# Patient Record
Sex: MALE | Race: BLACK OR AFRICAN AMERICAN | NOT HISPANIC OR LATINO | Employment: FULL TIME | URBAN - METROPOLITAN AREA
[De-identification: names, ages, dates, MRNs, and addresses within clinical notes are randomized per-mention and may not be internally consistent; named-entity substitution may affect disease eponyms.]

---

## 2017-04-25 ENCOUNTER — HOSPITAL ENCOUNTER (OUTPATIENT)
Dept: FAMILY MEDICINE CLINIC | Facility: CLINIC | Age: 47
Setting detail: SPECIMEN
Discharge: HOME | End: 2017-04-25
Attending: FAMILY MEDICINE | Admitting: FAMILY MEDICINE

## 2017-04-25 LAB
ANION GAP SERPL CALC-SCNC: 15 MMOL/L (ref 10–20)
BUN SERPL-MCNC: 9 MG/DL (ref 8–20)
BUN/CREAT SERPL: 6.4 (ref 6.2–20.3)
CALCIUM SERPL-MCNC: 9.5 MG/DL (ref 8.9–10.3)
CHLORIDE SERPL-SCNC: 103 MMOL/L (ref 101–111)
CHOLEST SERPL-MCNC: 155 MG/DL
CHOLEST/HDLC SERPL: 2.5 {RATIO}
CONV CO2: 28 MMOL/L (ref 22–32)
CONV LDL CHOLESTEROL DIRECT: 69 MG/DL (ref 0–100)
CREAT UR-MCNC: 1.4 MG/DL (ref 0.7–1.2)
GLUCOSE SERPL-MCNC: 91 MG/DL (ref 65–99)
HDLC SERPL-MCNC: 63 MG/DL
LDLC/HDLC SERPL: 1.1 {RATIO}
LIPID INTERPRETATION: ABNORMAL
POTASSIUM SERPL-SCNC: 4 MMOL/L (ref 3.6–5.1)
SODIUM SERPL-SCNC: 142 MMOL/L (ref 136–144)
TRIGL SERPL-MCNC: 51 MG/DL
VLDLC SERPL CALC-MCNC: 23.5 MG/DL

## 2017-07-03 ENCOUNTER — HOSPITAL ENCOUNTER (OUTPATIENT)
Dept: MRI IMAGING | Facility: HOSPITAL | Age: 47
Discharge: HOME | End: 2017-07-03
Attending: ORTHOPAEDIC SURGERY | Admitting: ORTHOPAEDIC SURGERY

## 2017-07-06 ENCOUNTER — HOSPITAL ENCOUNTER (OUTPATIENT)
Dept: PREADMISSION TESTING | Facility: HOSPITAL | Age: 47
Discharge: HOME | End: 2017-07-06
Attending: ORTHOPAEDIC SURGERY | Admitting: ORTHOPAEDIC SURGERY

## 2017-07-06 LAB
ANION GAP SERPL CALC-SCNC: 11.9 MMOL/L (ref 10–20)
BACTERIA SPEC AEROBE CULT: NORMAL
BASOPHILS # BLD AUTO: 0.1 10*3/UL (ref 0–0.2)
BASOPHILS NFR BLD AUTO: 1 % (ref 0–2)
BILIRUB UR QL STRIP: NEGATIVE MG/DL
BUN SERPL-MCNC: 14 MG/DL (ref 8–20)
BUN/CREAT SERPL: 11.7 (ref 6.2–20.3)
CALCIUM SERPL-MCNC: 9.4 MG/DL (ref 8.9–10.3)
CASTS URNS QL MICRO: NORMAL /[LPF]
CHLORIDE SERPL-SCNC: 103 MMOL/L (ref 101–111)
COLOR UR: YELLOW
CONV BACTERIA IN URINE MICRO: NEGATIVE
CONV CLARITY OF URINE: CLEAR
CONV CO2: 28 MMOL/L (ref 22–32)
CONV HYALINE CASTS IN URINE MICRO: 0 /[LPF] (ref 0–5)
CONV PROTEIN IN URINE BY AUTOMATED TEST STRIP: NEGATIVE MG/DL
CONV SMALL ROUND CELLS: NORMAL /[HPF]
CONV UROBILINOGEN IN URINE BY AUTOMATED TEST STRIP: 1 MG/DL
CREAT UR-MCNC: 1.2 MG/DL (ref 0.7–1.2)
CULTURE INDICATED?: NORMAL
DIFFERENTIAL METHOD BLD: (no result)
EOSINOPHIL # BLD AUTO: 0.4 10*3/UL (ref 0–0.3)
EOSINOPHIL # BLD AUTO: 7 % (ref 0–3)
ERYTHROCYTE [DISTWIDTH] IN BLOOD BY AUTOMATED COUNT: 12.8 % (ref 11.5–14.5)
GLUCOSE SERPL-MCNC: 92 MG/DL (ref 65–99)
GLUCOSE UR QL: NEGATIVE MG/DL
HCT VFR BLD AUTO: 46.2 % (ref 40–54)
HGB BLD-MCNC: 15.6 G/DL (ref 14–18)
HGB UR QL STRIP: NEGATIVE
KETONES UR QL STRIP: NEGATIVE MG/DL
LEUKOCYTE ESTERASE UR QL STRIP: NEGATIVE
LYMPHOCYTES # BLD AUTO: 1.6 10*3/UL (ref 0.8–4.8)
LYMPHOCYTES NFR BLD AUTO: 26 % (ref 18–42)
Lab: NORMAL
MCH RBC QN AUTO: 29 PG (ref 26–32)
MCHC RBC AUTO-ENTMCNC: 33.9 G/DL (ref 32–36)
MCV RBC AUTO: 85.6 FL (ref 80–94)
MICRO REPORT STATUS: NORMAL
MONOCYTES # BLD AUTO: 0.7 10*3/UL (ref 0.1–1.3)
MONOCYTES NFR BLD AUTO: 12 % (ref 2–11)
NEUTROPHILS # BLD AUTO: 3.2 10*3/UL (ref 2.3–8.6)
NEUTROPHILS NFR BLD AUTO: 54 % (ref 50–75)
NITRITE UR QL STRIP: NEGATIVE
NRBC BLD AUTO-RTO: 0 /100{WBCS}
NRBC/RBC NFR BLD MANUAL: 0 10*3/UL
PH UR STRIP.AUTO: 6.5 [PH] (ref 4.5–8)
PLATELET # BLD AUTO: 213 10*3/UL (ref 150–450)
PMV BLD AUTO: 9.1 FL (ref 7.4–10.4)
POTASSIUM SERPL-SCNC: 3.9 MMOL/L (ref 3.6–5.1)
RBC # BLD AUTO: 5.39 10*6/UL (ref 4.6–6)
RBC #/AREA URNS HPF: 0 /[HPF] (ref 0–3)
SODIUM SERPL-SCNC: 139 MMOL/L (ref 136–144)
SP GR UR: 1.02 (ref 1–1.03)
SPECIMEN SOURCE: NORMAL
SPERM URNS QL MICRO: NORMAL /[HPF]
SQUAMOUS SPT QL MICRO: 0 /[HPF] (ref 0–5)
UNIDENT CRYS URNS QL MICRO: NORMAL /[HPF]
WBC # BLD AUTO: 6 10*3/UL (ref 4.5–11.5)
WBC #/AREA URNS HPF: 0 /[HPF] (ref 0–5)
YEAST SPEC QL WET PREP: NORMAL /[HPF]

## 2017-07-12 ENCOUNTER — HOSPITAL ENCOUNTER (OUTPATIENT)
Dept: PREOP | Facility: HOSPITAL | Age: 47
Setting detail: HOSPITAL OUTPATIENT SURGERY
Discharge: HOME | End: 2017-07-12
Attending: ORTHOPAEDIC SURGERY | Admitting: ORTHOPAEDIC SURGERY

## 2018-04-24 ENCOUNTER — HOSPITAL ENCOUNTER (OUTPATIENT)
Dept: FAMILY MEDICINE CLINIC | Facility: CLINIC | Age: 48
Setting detail: SPECIMEN
Discharge: HOME | End: 2018-04-24
Attending: FAMILY MEDICINE | Admitting: FAMILY MEDICINE

## 2018-04-24 LAB
ALBUMIN SERPL-MCNC: 4 G/DL (ref 3.5–4.8)
ALBUMIN/GLOB SERPL: 1.2 {RATIO} (ref 1–1.7)
ALP SERPL-CCNC: 52 IU/L (ref 32–91)
ALT SERPL-CCNC: 27 IU/L (ref 17–63)
ANION GAP SERPL CALC-SCNC: 11.8 MMOL/L (ref 10–20)
AST SERPL-CCNC: 27 IU/L (ref 15–41)
BASOPHILS # BLD AUTO: 0 10*3/UL (ref 0–0.2)
BASOPHILS NFR BLD AUTO: 1 % (ref 0–2)
BILIRUB SERPL-MCNC: 0.5 MG/DL (ref 0.3–1.2)
BUN SERPL-MCNC: 14 MG/DL (ref 8–20)
BUN/CREAT SERPL: 10.8 (ref 6.2–20.3)
CALCIUM SERPL-MCNC: 9.2 MG/DL (ref 8.9–10.3)
CHLORIDE SERPL-SCNC: 104 MMOL/L (ref 101–111)
CHOLEST SERPL-MCNC: 140 MG/DL
CHOLEST/HDLC SERPL: 2.8 {RATIO}
CONV CO2: 28 MMOL/L (ref 22–32)
CONV LDL CHOLESTEROL DIRECT: 68 MG/DL (ref 0–100)
CONV TOTAL PROTEIN: 7.3 G/DL (ref 6.1–7.9)
CREAT UR-MCNC: 1.3 MG/DL (ref 0.7–1.2)
DIFFERENTIAL METHOD BLD: (no result)
EOSINOPHIL # BLD AUTO: 0.2 10*3/UL (ref 0–0.3)
EOSINOPHIL # BLD AUTO: 4 % (ref 0–3)
ERYTHROCYTE [DISTWIDTH] IN BLOOD BY AUTOMATED COUNT: 12.8 % (ref 11.5–14.5)
GLOBULIN UR ELPH-MCNC: 3.3 G/DL (ref 2.5–3.8)
GLUCOSE SERPL-MCNC: 86 MG/DL (ref 65–99)
HCT VFR BLD AUTO: 46.5 % (ref 40–54)
HDLC SERPL-MCNC: 51 MG/DL
HGB BLD-MCNC: 15.6 G/DL (ref 14–18)
LDLC/HDLC SERPL: 1.3 {RATIO}
LIPID INTERPRETATION: ABNORMAL
LYMPHOCYTES # BLD AUTO: 1.6 10*3/UL (ref 0.8–4.8)
LYMPHOCYTES NFR BLD AUTO: 32 % (ref 18–42)
MCH RBC QN AUTO: 28.7 PG (ref 26–32)
MCHC RBC AUTO-ENTMCNC: 33.7 G/DL (ref 32–36)
MCV RBC AUTO: 85.1 FL (ref 80–94)
MONOCYTES # BLD AUTO: 0.4 10*3/UL (ref 0.1–1.3)
MONOCYTES NFR BLD AUTO: 9 % (ref 2–11)
NEUTROPHILS # BLD AUTO: 2.6 10*3/UL (ref 2.3–8.6)
NEUTROPHILS NFR BLD AUTO: 54 % (ref 50–75)
NRBC BLD AUTO-RTO: 0 /100{WBCS}
NRBC/RBC NFR BLD MANUAL: 0 10*3/UL
PLATELET # BLD AUTO: 243 10*3/UL (ref 150–450)
PMV BLD AUTO: 9.2 FL (ref 7.4–10.4)
POTASSIUM SERPL-SCNC: 3.8 MMOL/L (ref 3.6–5.1)
RBC # BLD AUTO: 5.46 10*6/UL (ref 4.6–6)
SODIUM SERPL-SCNC: 140 MMOL/L (ref 136–144)
TRIGL SERPL-MCNC: 71 MG/DL
VLDLC SERPL CALC-MCNC: 21.7 MG/DL
WBC # BLD AUTO: 4.8 10*3/UL (ref 4.5–11.5)

## 2019-06-04 ENCOUNTER — HOSPITAL ENCOUNTER (OUTPATIENT)
Dept: FAMILY MEDICINE CLINIC | Facility: CLINIC | Age: 49
Setting detail: SPECIMEN
Discharge: HOME | End: 2019-06-04
Attending: FAMILY MEDICINE | Admitting: FAMILY MEDICINE

## 2019-06-04 ENCOUNTER — CONVERSION ENCOUNTER (OUTPATIENT)
Dept: FAMILY MEDICINE CLINIC | Facility: CLINIC | Age: 49
End: 2019-06-04

## 2019-06-04 VITALS
SYSTOLIC BLOOD PRESSURE: 127 MMHG | HEIGHT: 69 IN | WEIGHT: 204 LBS | HEART RATE: 70 BPM | BODY MASS INDEX: 30.21 KG/M2 | OXYGEN SATURATION: 97 % | DIASTOLIC BLOOD PRESSURE: 85 MMHG

## 2019-06-04 LAB
ALBUMIN SERPL-MCNC: 3.7 G/DL (ref 3.5–4.8)
ALBUMIN/GLOB SERPL: 1.3 {RATIO} (ref 1–1.7)
ALP SERPL-CCNC: 47 IU/L (ref 32–91)
ALT SERPL-CCNC: 19 IU/L (ref 17–63)
ANION GAP SERPL CALC-SCNC: 12.4 MMOL/L (ref 10–20)
AST SERPL-CCNC: 17 IU/L (ref 15–41)
BASOPHILS # BLD AUTO: 0.1 10*3/UL (ref 0–0.2)
BASOPHILS NFR BLD AUTO: 1 % (ref 0–2)
BILIRUB SERPL-MCNC: 0.9 MG/DL (ref 0.3–1.2)
BUN SERPL-MCNC: 12 MG/DL (ref 8–20)
BUN/CREAT SERPL: 9.2 (ref 6.2–20.3)
CALCIUM SERPL-MCNC: 8.8 MG/DL (ref 8.9–10.3)
CHLORIDE SERPL-SCNC: 103 MMOL/L (ref 101–111)
CHOLEST SERPL-MCNC: 166 MG/DL
CHOLEST/HDLC SERPL: 2.2 {RATIO}
CONV CO2: 27 MMOL/L (ref 22–32)
CONV LDL CHOLESTEROL DIRECT: 82 MG/DL (ref 0–100)
CONV TOTAL PROTEIN: 6.6 G/DL (ref 6.1–7.9)
CREAT UR-MCNC: 1.3 MG/DL (ref 0.7–1.2)
DIFFERENTIAL METHOD BLD: (no result)
EOSINOPHIL # BLD AUTO: 0.1 10*3/UL (ref 0–0.3)
EOSINOPHIL # BLD AUTO: 2 % (ref 0–3)
ERYTHROCYTE [DISTWIDTH] IN BLOOD BY AUTOMATED COUNT: 12.9 % (ref 11.5–14.5)
GLOBULIN UR ELPH-MCNC: 2.9 G/DL (ref 2.5–3.8)
GLUCOSE SERPL-MCNC: 84 MG/DL (ref 65–99)
HCT VFR BLD AUTO: 45.7 % (ref 40–54)
HDLC SERPL-MCNC: 75 MG/DL
HGB BLD-MCNC: 15.3 G/DL (ref 14–18)
LDLC/HDLC SERPL: 1.1 {RATIO}
LIPID INTERPRETATION: NORMAL
LYMPHOCYTES # BLD AUTO: 3 10*3/UL (ref 0.8–4.8)
LYMPHOCYTES NFR BLD AUTO: 42 % (ref 18–42)
MCH RBC QN AUTO: 29.3 PG (ref 26–32)
MCHC RBC AUTO-ENTMCNC: 33.6 G/DL (ref 32–36)
MCV RBC AUTO: 87.3 FL (ref 80–94)
MONOCYTES # BLD AUTO: 0.7 10*3/UL (ref 0.1–1.3)
MONOCYTES NFR BLD AUTO: 10 % (ref 2–11)
NEUTROPHILS # BLD AUTO: 3.1 10*3/UL (ref 2.3–8.6)
NEUTROPHILS NFR BLD AUTO: 45 % (ref 50–75)
NRBC BLD AUTO-RTO: 0 /100{WBCS}
NRBC/RBC NFR BLD MANUAL: 0 10*3/UL
PLATELET # BLD AUTO: 233 10*3/UL (ref 150–450)
PMV BLD AUTO: 9.3 FL (ref 7.4–10.4)
POTASSIUM SERPL-SCNC: 3.4 MMOL/L (ref 3.6–5.1)
RBC # BLD AUTO: 5.23 10*6/UL (ref 4.6–6)
SODIUM SERPL-SCNC: 139 MMOL/L (ref 136–144)
TRIGL SERPL-MCNC: 50 MG/DL
VLDLC SERPL CALC-MCNC: 8.9 MG/DL
WBC # BLD AUTO: 7 10*3/UL (ref 4.5–11.5)

## 2019-06-06 NOTE — PROGRESS NOTES
Visit Type:  Annual Physical  Referring Provider:  Ewelina Martinez MD  Primary Provider:  Juan Theodore MD    CC:  Physical and knee pain.    History of Present Illness:         The patient comes in today for a physical.  The patient doing well, he  denies cough,congestion,chest pain, palpitations, dizziness, syncope and SOB.  The patient admits to taking medications as prescribed, dietary compliance fair and exercising on   regular basis.  The         The patient also concern about  knee pain.  He is S/P R knee surgery , 2017 secondary to quadricep muscle damage. He  complains of right knee pain with running, but denies swelling, redness, decreased range of motion and unable to bear weight.        Past Medical History:     Reviewed history from 04/24/2018 and no changes required:        Asthma        Sleep Apnea- CPAP        Allergies        Allergy shots once weekly        Social History:     Reviewed history from 04/25/2014 and no changes required:                2 daughters        Risk Factors:     Smoked Tobacco Use:  Never smoker      Review of Systems     General       Denies fever, fatigue and sleep disorder.    ENT       Denies difficulty swallowing and sore throat.    CV       Denies chest pain or discomfort, lightheadedness, shortness of breath with exertion and palpitations.    Resp       Denies shortness of breath, chest discomfort and wheezing.    GI       Denies indigestion, abdominal pain and change in bowel habits.    MS       Denies joint swelling, back pain and decreased ROM.       R knee pain with running    Neuro       Denies poor balance, headaches, numbness, falling down and weakness.    Psych       Denies anxiety and depression.    Endo       Denies excessive hunger, excessive urination and excessive     thirst.      Vital Signs:    Patient Profile:    49 Years Old Male  Height:     69 inches  Weight:     204 pounds  BMI:        30.12     O2 Sat:     97 %  Temp:       204 degrees F  oral  Pulse rate: 70 / minute  BP Sittin / 85  (left arm)      Medications: Medications were reviewed with the patient during this visit.  Allergies: Allergies were reviewed with the patient during this visit.  No Known Allergy.        Vitals Entered By: Elliott Soto MA (2019 8:10 AM)      Physical Exam    General:      well developed, well nourished, in no acute distress.    Eyes:      PERRL/EOM intact, conjunctiva and sclera clear with out nystagmus.    Ears:      TM's intact and clear with normal canals with grossly normal hearing.    Mouth:      no deformity or lesions with good dentition.    Lungs:      clear bilaterally to auscultation.    Heart:      non-displaced PMI, chest non-tender; regular rate and rhythm, S1, S2 without murmurs, rubs, or gallops  Abdomen:       normal bowel sounds; non-tender.    Extremities:      R knee - no clubbing, cyanosis, edema, or deformity noted with normal full range of motion of joints of all four extremities   Neurologic:      no focal deficits, cranial nerves II-XII grossly intact with normal sensation, reflexes, coordination, muscle strength and tone.    Skin:      intact without lesions or rashes.    Psych:      alert and cooperative; normal mood and affect; normal attention span and concentration.        Blood Pressure:  Today's BP: 127/85 mm Hg    Labwork:   Most Recent Lab Results:   LDL: 68 mg/dL 2018    Active Medications (reviewed today):  PROAIR  (90 Base) MCG/ACT INHALATION AEROSOL SOLUTION (ALBUTEROL SULFATE) 1 puffs q 6 h prn  ADVAIR DISKUS 250-50 MCG/DOSE INHALATION AEROSOL POWDER BREATH ACTIVATED (FLUTICASONE-SALMETEROL) 1 puff twice a day.  SINGULAIR 10 MG ORAL TABLET (MONTELUKAST SODIUM) Take 1 tablet by mouth daily    Current Allergies (reviewed today):  No known allergies        Impression & Recommendations:    Problem # 1:  Preventive health care (ICD-V70.0) (LMI48-Z25.00)    Orders:  [facility] CBC W/DIFF; PATH REVIEW IF INDICATED  (CBC)  [facility] LIPID PANEL (LIPID)  [facility] COMPREHENSIVE METABOLIC PANEL (CMP) (MPC)  Est. Well Exam 40-64 yrs. ([id number])    Reviewed preventive care protocols, scheduled due services, discussed  diet and exercise.    Problem # 2:  Knee pain, right (ICD-719.46) (CBK46-J40.561)  Assessment: Unchanged    Orders:  Est. Well Exam 40-64 yrs. ([id number])          Patient Instructions:  1)  Please schedule a follow-up appointment as needed.                      Medication Administration    Orders Added:  1)  Physical Therapy Consult [PTOC]  2)  [facility] CBC W/DIFF; PATH REVIEW IF INDICATED [CBC]  3)  [facility] LIPID PANEL [LIPID]  4)  [facility] COMPREHENSIVE METABOLIC PANEL (CMP) [MPC]  5)  Est. Well Exam 40-64 yrs. [[id number]]  ]      Electronically signed by Ewelina Martinez MD on 06/04/2019 at 8:46 AM  ________________________________________________________________________       Disclaimer: Converted Note message may not contain all data elements that existed in the legacy source system. Please see [employer] System for the original note details.

## 2020-06-08 ENCOUNTER — LAB (OUTPATIENT)
Dept: FAMILY MEDICINE CLINIC | Facility: CLINIC | Age: 50
End: 2020-06-08

## 2020-06-08 ENCOUNTER — OFFICE VISIT (OUTPATIENT)
Dept: FAMILY MEDICINE CLINIC | Facility: CLINIC | Age: 50
End: 2020-06-08

## 2020-06-08 VITALS
OXYGEN SATURATION: 95 % | WEIGHT: 205 LBS | HEART RATE: 70 BPM | SYSTOLIC BLOOD PRESSURE: 135 MMHG | HEIGHT: 69 IN | TEMPERATURE: 97.3 F | BODY MASS INDEX: 30.36 KG/M2 | DIASTOLIC BLOOD PRESSURE: 85 MMHG

## 2020-06-08 DIAGNOSIS — Z12.5 SCREENING PSA (PROSTATE SPECIFIC ANTIGEN): ICD-10-CM

## 2020-06-08 DIAGNOSIS — Z00.00 ENCOUNTER FOR WELL ADULT EXAM WITHOUT ABNORMAL FINDINGS: ICD-10-CM

## 2020-06-08 DIAGNOSIS — Z12.11 SCREENING FOR COLON CANCER: ICD-10-CM

## 2020-06-08 DIAGNOSIS — L91.8 SKIN TAG: ICD-10-CM

## 2020-06-08 DIAGNOSIS — Z00.00 ENCOUNTER FOR WELL ADULT EXAM WITHOUT ABNORMAL FINDINGS: Primary | ICD-10-CM

## 2020-06-08 DIAGNOSIS — G47.30 SLEEP APNEA, UNSPECIFIED TYPE: ICD-10-CM

## 2020-06-08 LAB
ALBUMIN SERPL-MCNC: 4.3 G/DL (ref 3.5–5.2)
ALBUMIN/GLOB SERPL: 1.5 G/DL
ALP SERPL-CCNC: 52 U/L (ref 39–117)
ALT SERPL W P-5'-P-CCNC: 23 U/L (ref 1–41)
ANION GAP SERPL CALCULATED.3IONS-SCNC: 7 MMOL/L (ref 5–15)
AST SERPL-CCNC: 20 U/L (ref 1–40)
BASOPHILS # BLD AUTO: 0.05 10*3/MM3 (ref 0–0.2)
BASOPHILS NFR BLD AUTO: 0.9 % (ref 0–1.5)
BILIRUB SERPL-MCNC: 0.6 MG/DL (ref 0.2–1.2)
BUN BLD-MCNC: 14 MG/DL (ref 6–20)
BUN/CREAT SERPL: 11.8 (ref 7–25)
CALCIUM SPEC-SCNC: 9.3 MG/DL (ref 8.6–10.5)
CHLORIDE SERPL-SCNC: 103 MMOL/L (ref 98–107)
CHOLEST SERPL-MCNC: 119 MG/DL (ref 0–200)
CO2 SERPL-SCNC: 29 MMOL/L (ref 22–29)
CREAT BLD-MCNC: 1.19 MG/DL (ref 0.76–1.27)
DEPRECATED RDW RBC AUTO: 38.4 FL (ref 37–54)
EOSINOPHIL # BLD AUTO: 0.36 10*3/MM3 (ref 0–0.4)
EOSINOPHIL NFR BLD AUTO: 6.4 % (ref 0.3–6.2)
ERYTHROCYTE [DISTWIDTH] IN BLOOD BY AUTOMATED COUNT: 12.4 % (ref 12.3–15.4)
GFR SERPL CREATININE-BSD FRML MDRD: 78 ML/MIN/1.73
GLOBULIN UR ELPH-MCNC: 2.9 GM/DL
GLUCOSE BLD-MCNC: 92 MG/DL (ref 65–99)
HCT VFR BLD AUTO: 43.9 % (ref 37.5–51)
HDLC SERPL-MCNC: 66 MG/DL (ref 40–60)
HGB BLD-MCNC: 14.8 G/DL (ref 13–17.7)
IMM GRANULOCYTES # BLD AUTO: 0.01 10*3/MM3 (ref 0–0.05)
IMM GRANULOCYTES NFR BLD AUTO: 0.2 % (ref 0–0.5)
LDLC SERPL CALC-MCNC: 45 MG/DL (ref 0–100)
LDLC/HDLC SERPL: 0.68 {RATIO}
LYMPHOCYTES # BLD AUTO: 1.44 10*3/MM3 (ref 0.7–3.1)
LYMPHOCYTES NFR BLD AUTO: 25.6 % (ref 19.6–45.3)
MCH RBC QN AUTO: 29.2 PG (ref 26.6–33)
MCHC RBC AUTO-ENTMCNC: 33.7 G/DL (ref 31.5–35.7)
MCV RBC AUTO: 86.6 FL (ref 79–97)
MONOCYTES # BLD AUTO: 0.55 10*3/MM3 (ref 0.1–0.9)
MONOCYTES NFR BLD AUTO: 9.8 % (ref 5–12)
NEUTROPHILS # BLD AUTO: 3.22 10*3/MM3 (ref 1.7–7)
NEUTROPHILS NFR BLD AUTO: 57.1 % (ref 42.7–76)
NRBC BLD AUTO-RTO: 0 /100 WBC (ref 0–0.2)
PLATELET # BLD AUTO: 219 10*3/MM3 (ref 140–450)
PMV BLD AUTO: 11.4 FL (ref 6–12)
POTASSIUM BLD-SCNC: 4.1 MMOL/L (ref 3.5–5.2)
PROT SERPL-MCNC: 7.2 G/DL (ref 6–8.5)
PSA SERPL-MCNC: 0.36 NG/ML (ref 0–4)
RBC # BLD AUTO: 5.07 10*6/MM3 (ref 4.14–5.8)
SODIUM BLD-SCNC: 139 MMOL/L (ref 136–145)
TRIGL SERPL-MCNC: 42 MG/DL (ref 0–150)
TSH SERPL DL<=0.05 MIU/L-ACNC: 1.49 UIU/ML (ref 0.27–4.2)
VLDLC SERPL-MCNC: 8.4 MG/DL (ref 5–40)
WBC NRBC COR # BLD: 5.63 10*3/MM3 (ref 3.4–10.8)

## 2020-06-08 PROCEDURE — 80053 COMPREHEN METABOLIC PANEL: CPT | Performed by: FAMILY MEDICINE

## 2020-06-08 PROCEDURE — 85025 COMPLETE CBC W/AUTO DIFF WBC: CPT | Performed by: FAMILY MEDICINE

## 2020-06-08 PROCEDURE — 84443 ASSAY THYROID STIM HORMONE: CPT | Performed by: FAMILY MEDICINE

## 2020-06-08 PROCEDURE — 36415 COLL VENOUS BLD VENIPUNCTURE: CPT

## 2020-06-08 PROCEDURE — 99396 PREV VISIT EST AGE 40-64: CPT | Performed by: FAMILY MEDICINE

## 2020-06-08 PROCEDURE — 80061 LIPID PANEL: CPT | Performed by: FAMILY MEDICINE

## 2020-06-08 PROCEDURE — G0103 PSA SCREENING: HCPCS | Performed by: FAMILY MEDICINE

## 2020-06-08 RX ORDER — ALBUTEROL SULFATE 90 UG/1
1 AEROSOL, METERED RESPIRATORY (INHALATION) EVERY 6 HOURS PRN
COMMUNITY
Start: 2014-04-25

## 2020-06-08 RX ORDER — VIT C/HESPERIDIN/BIOFLAVONOIDS 500-100 MG
1 TABLET ORAL DAILY
COMMUNITY

## 2020-06-08 RX ORDER — RANITIDINE 150 MG/1
1 TABLET ORAL DAILY
COMMUNITY
End: 2022-06-29

## 2020-06-08 RX ORDER — VITAMIN B COMPLEX
1000 TABLET ORAL DAILY
COMMUNITY

## 2020-06-08 RX ORDER — MONTELUKAST SODIUM 10 MG/1
1 TABLET ORAL DAILY
COMMUNITY
Start: 2020-05-11

## 2020-06-08 RX ORDER — ASCORBIC ACID 500 MG
500 TABLET ORAL DAILY
COMMUNITY

## 2020-06-08 RX ORDER — CETIRIZINE HYDROCHLORIDE 10 MG/1
1 TABLET ORAL DAILY
COMMUNITY

## 2020-06-08 NOTE — ASSESSMENT & PLAN NOTE
Discussed preventive care protocol and  importance of regular exercise and recommend starting or continuing a regular exercise program for good health.  Annual flu shots are recommended every year in the fall.  Discussed screening for colon cancer patient preferred Cologuard order placed.  We will check CBC, CMP, TSH, fasting lipid panel and screening PSA.

## 2020-06-08 NOTE — PROGRESS NOTES
Subjective   Ashish Ruffin is a 50 y.o. male.     History of Present Illness     The patient comes in today for a annual physical.  The patient doing well denies fatigue, cold intolerance, headache, cough,chest pain, palpitations, dizziness and SOB. The patient admits to dietary compliance is  fair  and exercising occasionally.  He is a non-smoker.   Patient also complaining of skin tag on the left eye requesting a referral.  He also has known history of sleep apnea using CPAP machine. He is  questing supplies for CPAP,  his last sleep study was done many many years ago when he was in the .      The following portions of the patient's history were reviewed and updated as appropriate: past medical history, past social history, past surgical history and problem list.    Review of Systems   Constitutional: Negative for fatigue, fever and unexpected weight gain.   HENT: Negative for ear pain, sinus pressure, sore throat and trouble swallowing.    Eyes: Negative for blurred vision and visual disturbance.   Respiratory: Negative for cough, shortness of breath and wheezing.    Cardiovascular: Negative for chest pain and palpitations.   Gastrointestinal: Negative for abdominal pain, diarrhea, nausea and vomiting.   Endocrine: Negative for cold intolerance, polydipsia, polyphagia and polyuria.   Genitourinary: Negative for difficulty urinating and dysuria.   Skin:        Skin tag on the left eye.   Neurological: Negative for dizziness, weakness and headache.   Psychiatric/Behavioral: Negative for sleep disturbance, suicidal ideas and depressed mood. The patient is not nervous/anxious.        Objective   Physical Exam   Constitutional: He is oriented to person, place, and time. He appears well-developed. No distress.   Eyes: Pupils are equal, round, and reactive to light. Conjunctivae and EOM are normal.   Neck: Normal range of motion. Neck supple. No thyromegaly present.   Cardiovascular: Normal rate, regular  rhythm and normal heart sounds.   Pulmonary/Chest: Effort normal and breath sounds normal. He has no wheezes.   Abdominal: Soft. Bowel sounds are normal. There is no tenderness.   Musculoskeletal: Normal range of motion.   Neurological: He is alert and oriented to person, place, and time.   Skin:   Skin tag of the left eye.   Psychiatric: He has a normal mood and affect.   Vitals reviewed.    Vitals:    06/08/20 0825   BP: 135/85   Pulse: 70   Temp: 97.3 °F (36.3 °C)   SpO2: 95%     Current Outpatient Medications on File Prior to Visit   Medication Sig Dispense Refill   • albuterol sulfate HFA (ProAir HFA) 108 (90 Base) MCG/ACT inhaler Inhale 1 puff Every 6 (Six) Hours As Needed.     • cetirizine (zyrTEC) 10 MG tablet Take 1 tablet by mouth Daily.     • Cholecalciferol (VITAMIN D) 25 MCG (1000 UT) tablet Take 1,000 Units by mouth Daily.     • fluticasone-salmeterol (Advair Diskus) 250-50 MCG/DOSE DISKUS Inhale 1 puff 2 (Two) Times a Day.     • montelukast (SINGULAIR) 10 MG tablet Take 1 tablet by mouth Daily.     • raNITIdine (ZANTAC) 150 MG tablet Take 1 tablet by mouth Daily.     • vitamin C (ASCORBIC ACID) 500 MG tablet Take 500 mg by mouth Daily.     • Zinc 30 MG tablet Take 1 tablet by mouth Daily.       No current facility-administered medications on file prior to visit.            Assessment/Plan   Problems Addressed this Visit        Respiratory    Sleep apnea    Relevant Orders    Ambulatory Referral to Sleep Medicine       Musculoskeletal and Integument    Skin tag of left eye    Relevant Orders    Ambulatory Referral to Dermatology       Other    Encounter for well adult exam without abnormal findings - Primary     Discussed preventive care protocol and  importance of regular exercise and recommend starting or continuing a regular exercise program for good health.  Annual flu shots are recommended every year in the fall.  Discussed screening for colon cancer patient preferred Cologuard order placed.  We  will check CBC, CMP, TSH, fasting lipid panel and screening PSA.               Relevant Orders    Comprehensive metabolic panel    Lipid panel    CBC w AUTO Differential    TSH    Screening PSA (prostate specific antigen)    Relevant Orders    PSA SCREENING    Screening for colon cancer    Relevant Orders    Cologuard - Stool, Per Rectum

## 2020-06-25 ENCOUNTER — OFFICE VISIT (OUTPATIENT)
Dept: NEUROLOGY | Facility: CLINIC | Age: 50
End: 2020-06-25

## 2020-06-25 VITALS
WEIGHT: 202.8 LBS | BODY MASS INDEX: 30.04 KG/M2 | HEART RATE: 78 BPM | DIASTOLIC BLOOD PRESSURE: 88 MMHG | SYSTOLIC BLOOD PRESSURE: 125 MMHG | HEIGHT: 69 IN | TEMPERATURE: 97.7 F

## 2020-06-25 DIAGNOSIS — G47.33 OBSTRUCTIVE SLEEP APNEA ON CPAP: Primary | ICD-10-CM

## 2020-06-25 DIAGNOSIS — Z99.89 OBSTRUCTIVE SLEEP APNEA ON CPAP: Primary | ICD-10-CM

## 2020-06-25 PROBLEM — J45.909 ASTHMA: Status: ACTIVE | Noted: 2020-06-25

## 2020-06-25 PROBLEM — N18.30 CHRONIC RENAL INSUFFICIENCY, STAGE III (MODERATE) (HCC): Status: ACTIVE | Noted: 2020-06-25

## 2020-06-25 PROBLEM — M25.561 KNEE PAIN, RIGHT: Status: ACTIVE | Noted: 2017-06-19

## 2020-06-25 PROBLEM — R94.4 RENAL FUNCTION TEST ABNORMAL: Status: ACTIVE | Noted: 2017-04-28

## 2020-06-25 PROCEDURE — 99203 OFFICE O/P NEW LOW 30 MIN: CPT | Performed by: PSYCHIATRY & NEUROLOGY

## 2020-06-25 NOTE — PROGRESS NOTES
Sleep Medicine initial Consultation    Ashish Ruffin  : 1970  50 y.o. male   Date of Service: 2020  Referring provider: MD Abraham To, neck measures 17 inches    He also has known history of sleep apnea using CPAP machine.   He is  questing supplies for CPAP,   his last sleep study was done [id number] many many years ago when he was in the .     Patient currently uses nasal pillows and goes through a provider in [place] through the mail and would like to change to a local provider  Patient would like to get a travel cpap machine.     Mr. Ashish Ruffin  is a 50 y.o. right handed  male patient has a H/O NONE is here for the evaluation of Sleep Apnea.     The patient c/o daytime sleepiness issues:  There is no H/O sleep paralysis, hypnagogic hallucinations or cataplexy..      The patient complains of snoring has gained 5-10 lbs of weight the the last 5 years.    The patient complains of Leg symptoms: No symptoms of rls.     The patient complains of problems with insomnia: patient states no problems with sleep since has been on the pap machine.    The patient reports history of these childhood sleep problems: There is no h/O sleepwalking or bedwetting or nightmares or sleep eating or acting out dreams    Sleep schedule: Bedtime:10:30 , gets out of bed at 5-5:45, sleep latency: couple of minutes, Gets about 7 hours of sleep.      EPWORTH SLEEPINESS SCALE  Sitting and reading  0  WatchingTV  0  Sitting, inactive, in a public place  0  As a passenger in a car for 1 hour w/o a break  0  Lying down to rest in the afternoon  3  Sitting and talking to someone  0  Sitting quietly after a lunch  0  In a car, while stopped for traffic or a light  0  Total 3        3/24.  Past Medical History:   Diagnosis Date   • Abnormal results of kidney function studies    • Allergies     SHOTS ONCE WEEKLY   • Asthma    • Knee pain, right    • Obstructive sleep apnea on CPAP    • Skin rash      LEFT HAND   • Trigger finger, left little finger    • Urticaria, unspecified      Past Surgical History:   Procedure Laterality Date   • FRONTALIS SUSPENSION Left 2005     Current Outpatient Medications on File Prior to Visit   Medication Sig Dispense Refill   • albuterol sulfate HFA (ProAir HFA) 108 (90 Base) MCG/ACT inhaler Inhale 1 puff Every 6 (Six) Hours As Needed.     • cetirizine (zyrTEC) 10 MG tablet Take 1 tablet by mouth Daily.     • Cholecalciferol (VITAMIN D) 25 MCG (1000 UT) tablet Take 1,000 Units by mouth Daily.     • fluticasone-salmeterol (Advair Diskus) 250-50 MCG/DOSE DISKUS Inhale 1 puff 2 (Two) Times a Day.     • montelukast (SINGULAIR) 10 MG tablet Take 1 tablet by mouth Daily.     • raNITIdine (ZANTAC) 150 MG tablet Take 1 tablet by mouth Daily.     • vitamin C (ASCORBIC ACID) 500 MG tablet Take 500 mg by mouth Daily.     • Zinc 30 MG tablet Take 1 tablet by mouth Daily.       No current facility-administered medications on file prior to visit.      No Known Allergies  Family History   Problem Relation Age of Onset   • Celiac disease Mother      Social History     Socioeconomic History   • Marital status:      Spouse name: Not on file   • Number of children: Not on file   • Years of education: Not on file   • Highest education level: Not on file   Tobacco Use   • Smoking status: Never Smoker   • Smokeless tobacco: Never Used   Substance and Sexual Activity   • Alcohol use: Yes     Alcohol/week: 1.0 standard drinks     Types: 1 Cans of beer per week   • Drug use: Never   • Sexual activity: Yes     Partners: Female     Review of Systems   Constitutional: Negative for appetite change and fatigue.   HENT: Negative for sinus pressure and sinus pain.    Eyes: Negative for pain and itching.   Respiratory: Positive for apnea. Negative for shortness of breath.    Cardiovascular: Negative for chest pain and palpitations.   Gastrointestinal: Negative for constipation and diarrhea.   Endocrine:  Negative for cold intolerance and heat intolerance.   Genitourinary: Negative for difficulty urinating and frequency.   Musculoskeletal: Negative for back pain and neck pain.   Allergic/Immunologic: Positive for environmental allergies.   Neurological: Negative for dizziness, tremors, seizures, syncope, facial asymmetry, speech difficulty, weakness, light-headedness, numbness and headaches.   Psychiatric/Behavioral: Negative for agitation and confusion.       I reviewed and addressed ROS entered by MA.    Patient examination:    Vitals:    06/25/20 1431   BP: 125/88   Pulse: 78   Temp: 97.7 °F (36.5 °C)    Body mass index is 29.95 kg/m².     Physical Exam   Constitutional: He is oriented to person, place, and time. He appears well-developed and well-nourished.   HENT:   Nose: Nose normal.   Mouth/Throat: Oropharynx is clear and moist.   Eyes: Pupils are equal, round, and reactive to light. Conjunctivae and EOM are normal.   Cardiovascular: Normal rate, regular rhythm and normal heart sounds.   Pulmonary/Chest: Effort normal and breath sounds normal. No respiratory distress.   Musculoskeletal: Normal range of motion. He exhibits no edema or deformity.   Neurological: He is alert and oriented to person, place, and time. No cranial nerve deficit.   Psychiatric: He has a normal mood and affect. His behavior is normal.   Vitals reviewed.      ASSESSMENT AND PLAN:    LADONNA treated with CPAP    Encounter Diagnosis   Name Primary?   • Obstructive sleep apnea on CPAP Yes     Request records , re consultation and diagnostic sleep test,     Obtain CPAP down load., adjust pressure if needed    Then order supplies and travel cpap    Return in about 1 year (around 6/25/2021) for Recheck.    This document has been electronically signed by Joseph Seipel, MD  on June 26, 2020 05:35

## 2021-06-21 NOTE — PROGRESS NOTES
Chief Complaint  Sleep Apnea    Subjective          Ashish Ruffin presents to [facility] NEUROLOGY  History of Present Illness     This patient was evaluated in 2013,  The diagnostic testing is not available    He does not sleep well with out the cpap and snores  Notes sore throat and wakes frequently with out cpap with cpap sleeps well  Patient is here yearly f/u on cpap patient states he is doing great and can not sleep w/o machine he currently uses nasal pillows and goes through [employer] for supplies.  No new issues or concerns today.    Patient states he would like written rx for a travel cpap machine.. patient did not bring in chip today.          =============================Previous OV 6/25/20================================================  New sleep, neck measures 17 inches   He also has known history of sleep apnea using CPAP machine.   He is  questing supplies for CPAP,   his last sleep study was done [id number] many many years ago when he was in the .    Patient currently uses nasal pillows and goes through a provider in [place] through the mail and would like to change to a local provider  Patient would like to get a travel cpap machine.    Mr. Ashish Ruffin  is a 50 y.o. right handed  male patient has a H/O NONE is here for the evaluation of Sleep Apnea.    The patient c/o daytime sleepiness issues:  There is no H/O sleep paralysis, hypnagogic hallucinations or cataplexy..     The patient complains of snoring has gained 5-10 lbs of weight the the last 5 years.   The patient complains of Leg symptoms: No symptoms of rls.    The patient complains of problems with insomnia: patient states no problems with sleep since has been on the pap machine.   The patient reports history of these childhood sleep problems: There is no h/O sleepwalking or bedwetting or nightmares or sleep eating or acting out dreams   Sleep schedule: Bedtime:10:30 , gets out of bed at 5-5:45, sleep  "latency: couple of minutes, Gets about 7 hours of sleep.       Sleep testing history:      PAP download:  Current Pending  On cpap 6-16    Report from aug 2020 .   Data indicates Excellent compliance. With 100% usage for more than 4 hours with an average usage of 7 hours 11 minutes. AHI down to 0.3-----    The patient's hypersomnia has resolved       [place] Sleepiness Scale:  Sitting and reading 0 WatchingTV 0  Sitting, inactive, in a public place 0  As a passenger in a car for 1 hour w/o a break  0  Lying down to rest in the afternoon  2  Sitting and talking to someone  0  Sitting quietly after a lunch  0  In a car, while stopped for traffic or a light  0  Total 2    Review of Systems   Constitutional: Negative for chills and fever.   HENT: Negative for nosebleeds and sore throat.    Eyes: Negative for discharge and redness.   Respiratory: Negative for cough and wheezing.    Cardiovascular: Negative for palpitations.   Gastrointestinal: Negative for abdominal pain and nausea.   Endocrine: Negative for cold intolerance and heat intolerance.   Genitourinary: Negative for decreased urine volume and urgency.   Musculoskeletal: Positive for neck pain and neck stiffness. Negative for back pain.   Neurological: Negative for dizziness and weakness.   Psychiatric/Behavioral: Negative for confusion and sleep disturbance.         Objective   Vital Signs:   /92 (BP Location: Left arm, Patient Position: Sitting, Cuff Size: Large Adult)   Pulse 66   Temp 97.8 °F (36.6 °C) (Temporal)   Ht 175.3 cm (69\")   Wt 93.8 kg (206 lb 12.8 oz)   BMI 30.54 kg/m²     Physical Exam  Vitals reviewed.   HENT:      Nose: Nose normal.   Neurological:      General: No focal deficit present.      Mental Status: He is alert.   Psychiatric:         Mood and Affect: Mood normal.        Result Review :                 Assessment and Plan    Diagnoses and all orders for this visit:    1. Obstructive sleep apnea on CPAP (Primary)    continue " cpap 6-16  Pt to order the travel cpap through cpap [employer]    The patient is compliant with and benefiting from PAP therapy.      Follow Up {Instructions Charge Capture  Follow-up Communications :23}  Return in about 1 year (around 6/28/2022).  Patient was given instructions and counseling regarding his condition or for health maintenance advice. Please see specific information pulled into the AVS if appropriate.

## 2021-06-28 ENCOUNTER — OFFICE VISIT (OUTPATIENT)
Dept: NEUROLOGY | Facility: CLINIC | Age: 51
End: 2021-06-28

## 2021-06-28 VITALS
HEART RATE: 66 BPM | BODY MASS INDEX: 30.63 KG/M2 | HEIGHT: 69 IN | TEMPERATURE: 97.8 F | SYSTOLIC BLOOD PRESSURE: 126 MMHG | DIASTOLIC BLOOD PRESSURE: 92 MMHG | WEIGHT: 206.8 LBS

## 2021-06-28 DIAGNOSIS — G47.33 OBSTRUCTIVE SLEEP APNEA ON CPAP: Primary | ICD-10-CM

## 2021-06-28 DIAGNOSIS — Z99.89 OBSTRUCTIVE SLEEP APNEA ON CPAP: Primary | ICD-10-CM

## 2021-06-28 PROCEDURE — 99212 OFFICE O/P EST SF 10 MIN: CPT | Performed by: PSYCHIATRY & NEUROLOGY

## 2021-10-08 ENCOUNTER — TELEPHONE (OUTPATIENT)
Dept: FAMILY MEDICINE CLINIC | Facility: CLINIC | Age: 51
End: 2021-10-08

## 2021-10-08 DIAGNOSIS — J45.20 MILD INTERMITTENT ASTHMA WITHOUT COMPLICATION: Primary | ICD-10-CM

## 2022-06-27 NOTE — PROGRESS NOTES
"Chief Complaint  Sleep Apnea    Subjective          Ashish Ruffin presents to [facility] NEUROLOGY  History of Present Illness  Patient is here yearly f/u on cpap patient states he is doing great and can not sleep w/o machine     he currently uses nasal pillows and goes through [employer] for supplies.     Patient states he also purchased machine from [employer]  PAP download:  Two years ago was on 6-16 with good control   Uses  Nightly 100% indicator,     [place] Sleepiness Scale:  Sitting and reading 1 WatchingTV 0  Sitting, inactive, in a public place 0  As a passenger in a car for 1 hour w/o a break  0  Lying down to rest in the afternoon  1  Sitting and talking to someone  0  Sitting quietly after a lunch  0  In a car, while stopped for traffic or a light  0  Total 2      Review of Systems   HENT: Positive for postnasal drip.    Respiratory: Positive for apnea.    Allergic/Immunologic: Positive for environmental allergies and food allergies.   All other systems reviewed and are negative.        Objective   Vital Signs:   /88   Pulse 71   Temp 98.2 °F (36.8 °C) (Temporal)   Resp 16   Ht 175.3 cm (69\")   Wt 94.8 kg (209 lb)   BMI 30.86 kg/m²     Physical Exam  Vitals reviewed.   Cardiovascular:      Rate and Rhythm: Normal rate.   Pulmonary:      Effort: Pulmonary effort is normal.   Neurological:      General: No focal deficit present.      Mental Status: He is alert and oriented to person, place, and time.        Result Review :                 Assessment and Plan    Diagnoses and all orders for this visit:    1. Obstructive sleep apnea on CPAP (Primary)      Pt found the cpap to be life changing.   The patient is compliant with and benefiting from PAP therapy.      Follow Up   Return in about 1 year (around 6/29/2023).    Patient was given instructions and counseling regarding his condition or for health maintenance advice. Please see specific information pulled into the AVS if " appropriate.       This document has been electronically signed by Joseph Seipel, MD on June 29, 2022 16:15 EDT

## 2022-06-29 ENCOUNTER — OFFICE VISIT (OUTPATIENT)
Dept: NEUROLOGY | Facility: CLINIC | Age: 52
End: 2022-06-29

## 2022-06-29 VITALS
WEIGHT: 209 LBS | RESPIRATION RATE: 16 BRPM | HEIGHT: 69 IN | TEMPERATURE: 98.2 F | DIASTOLIC BLOOD PRESSURE: 88 MMHG | BODY MASS INDEX: 30.96 KG/M2 | SYSTOLIC BLOOD PRESSURE: 130 MMHG | HEART RATE: 71 BPM

## 2022-06-29 DIAGNOSIS — G47.33 OBSTRUCTIVE SLEEP APNEA ON CPAP: Primary | ICD-10-CM

## 2022-06-29 DIAGNOSIS — Z99.89 OBSTRUCTIVE SLEEP APNEA ON CPAP: Primary | ICD-10-CM

## 2022-06-29 PROCEDURE — 99213 OFFICE O/P EST LOW 20 MIN: CPT | Performed by: PSYCHIATRY & NEUROLOGY

## 2022-08-25 ENCOUNTER — TELEPHONE (OUTPATIENT)
Dept: FAMILY MEDICINE CLINIC | Facility: CLINIC | Age: 52
End: 2022-08-25

## 2022-08-25 DIAGNOSIS — J45.20 MILD INTERMITTENT ASTHMA WITHOUT COMPLICATION: Primary | ICD-10-CM

## 2022-09-08 ENCOUNTER — LAB (OUTPATIENT)
Dept: FAMILY MEDICINE CLINIC | Facility: CLINIC | Age: 52
End: 2022-09-08

## 2022-09-08 ENCOUNTER — OFFICE VISIT (OUTPATIENT)
Dept: FAMILY MEDICINE CLINIC | Facility: CLINIC | Age: 52
End: 2022-09-08

## 2022-09-08 VITALS
DIASTOLIC BLOOD PRESSURE: 93 MMHG | HEIGHT: 69 IN | HEART RATE: 67 BPM | OXYGEN SATURATION: 97 % | BODY MASS INDEX: 30.78 KG/M2 | TEMPERATURE: 97.8 F | WEIGHT: 207.8 LBS | SYSTOLIC BLOOD PRESSURE: 136 MMHG | RESPIRATION RATE: 16 BRPM

## 2022-09-08 DIAGNOSIS — Z00.00 ENCOUNTER FOR GENERAL ADULT MEDICAL EXAMINATION WITHOUT ABNORMAL FINDINGS: Primary | ICD-10-CM

## 2022-09-08 PROCEDURE — 80053 COMPREHEN METABOLIC PANEL: CPT | Performed by: FAMILY MEDICINE

## 2022-09-08 PROCEDURE — 99396 PREV VISIT EST AGE 40-64: CPT | Performed by: FAMILY MEDICINE

## 2022-09-08 PROCEDURE — 80061 LIPID PANEL: CPT | Performed by: FAMILY MEDICINE

## 2022-09-08 PROCEDURE — 85025 COMPLETE CBC W/AUTO DIFF WBC: CPT | Performed by: FAMILY MEDICINE

## 2022-09-08 PROCEDURE — 36415 COLL VENOUS BLD VENIPUNCTURE: CPT | Performed by: FAMILY MEDICINE

## 2022-09-08 PROCEDURE — 84443 ASSAY THYROID STIM HORMONE: CPT | Performed by: FAMILY MEDICINE

## 2022-09-08 NOTE — PROGRESS NOTES
Subjective   Ashish Ruffin is a 52 y.o. male.     History of Present Illness     The patient comes in today for annual physical.  The patient is doing well denies anxiety, depression, sleep disturbance, fatigue, cold intolerance, headache, cough, chest pain, abdominal pain, nausea, vomiting, difficulty urination, palpitations, dizziness and SOB. The patient admits to dietary compliance is  fair  and exercising occasionally.  He is a non-smoker.      The following portions of the patient's history were reviewed and updated as appropriate: past medical history, past social history, past surgical history and problem list.    Review of Systems   Constitutional: Negative for activity change, appetite change, fatigue and fever.   HENT: Negative for trouble swallowing.    Respiratory: Negative for shortness of breath and wheezing.    Cardiovascular: Negative for chest pain and palpitations.   Gastrointestinal: Negative for abdominal pain, nausea, vomiting and indigestion.   Endocrine: Negative for cold intolerance.   Genitourinary: Negative for dysuria.   Neurological: Negative for dizziness and headache.   Psychiatric/Behavioral: Negative for sleep disturbance and depressed mood. The patient is not nervous/anxious.        Objective   Physical Exam  Vitals reviewed.   Constitutional:       General: He is not in acute distress.     Appearance: He is well-developed.   Eyes:      Pupils: Pupils are equal, round, and reactive to light.   Neck:      Thyroid: No thyromegaly.   Cardiovascular:      Rate and Rhythm: Normal rate.      Heart sounds: Normal heart sounds.   Pulmonary:      Effort: Pulmonary effort is normal.      Breath sounds: Normal breath sounds. No wheezing.   Abdominal:      General: Bowel sounds are normal.      Palpations: Abdomen is soft.      Tenderness: There is no abdominal tenderness.   Musculoskeletal:         General: Normal range of motion.      Cervical back: Normal range of motion and neck supple.    Neurological:      Mental Status: He is alert and oriented to person, place, and time.   Psychiatric:         Mood and Affect: Mood normal.       Vitals:    09/08/22 1410   BP: 136/93   Pulse: 67   Resp: 16   Temp: 97.8 °F (36.6 °C)   SpO2: 97%     Body mass index is 30.69 kg/m².   BMI is >= 30 and <35. (Class 1 Obesity). The following options were offered after discussion;: exercise counseling/recommendations and nutrition counseling/recommendations   BMI is above average BMI management plan is completed.    Current Outpatient Medications on File Prior to Visit   Medication Sig Dispense Refill   • albuterol sulfate  (90 Base) MCG/ACT inhaler Inhale 1 puff Every 6 (Six) Hours As Needed.     • cetirizine (zyrTEC) 10 MG tablet Take 1 tablet by mouth Daily.     • Cholecalciferol (VITAMIN D) 25 MCG (1000 UT) tablet Take 1,000 Units by mouth Daily.     • fluticasone-salmeterol (ADVAIR) 250-50 MCG/DOSE DISKUS Inhale 1 puff 2 (Two) Times a Day.     • montelukast (SINGULAIR) 10 MG tablet Take 1 tablet by mouth Daily.     • vitamin C (ASCORBIC ACID) 500 MG tablet Take 500 mg by mouth Daily.     • Zinc 30 MG tablet Take 1 tablet by mouth Daily.       No current facility-administered medications on file prior to visit.           Assessment & Plan   Problems Addressed this Visit        Health Encounters    Encounter for general adult medical examination without abnormal findings - Primary     Discussed healthy diet and  importance of regular exercise and recommend starting or continuing a regular exercise program for good health.  Stressed importance of moderation in sodium/caffeine intake, saturated fat and cholesterol, caloric balance, sufficient intake of fresh fruits and vegetables.           Relevant Orders    Lipid panel    TSH    Comprehensive metabolic panel    CBC w AUTO Differential      Diagnoses       Codes Comments    Encounter for general adult medical examination without abnormal findings    -  Primary  ICD-10-CM: Z00.00  ICD-9-CM: V70.9

## 2022-09-09 LAB
ALBUMIN SERPL-MCNC: 4.1 G/DL (ref 3.5–5.2)
ALBUMIN/GLOB SERPL: 1.4 G/DL
ALP SERPL-CCNC: 58 U/L (ref 39–117)
ALT SERPL W P-5'-P-CCNC: 18 U/L (ref 1–41)
ANION GAP SERPL CALCULATED.3IONS-SCNC: 10.9 MMOL/L (ref 5–15)
AST SERPL-CCNC: 18 U/L (ref 1–40)
BASOPHILS # BLD AUTO: 0.07 10*3/MM3 (ref 0–0.2)
BASOPHILS NFR BLD AUTO: 1.1 % (ref 0–1.5)
BILIRUB SERPL-MCNC: 0.6 MG/DL (ref 0–1.2)
BUN SERPL-MCNC: 10 MG/DL (ref 6–20)
BUN/CREAT SERPL: 8.5 (ref 7–25)
CALCIUM SPEC-SCNC: 9.3 MG/DL (ref 8.6–10.5)
CHLORIDE SERPL-SCNC: 102 MMOL/L (ref 98–107)
CHOLEST SERPL-MCNC: 142 MG/DL (ref 0–200)
CO2 SERPL-SCNC: 28.1 MMOL/L (ref 22–29)
CREAT SERPL-MCNC: 1.18 MG/DL (ref 0.76–1.27)
DEPRECATED RDW RBC AUTO: 39.9 FL (ref 37–54)
EGFRCR SERPLBLD CKD-EPI 2021: 74.2 ML/MIN/1.73
EOSINOPHIL # BLD AUTO: 0.45 10*3/MM3 (ref 0–0.4)
EOSINOPHIL NFR BLD AUTO: 7 % (ref 0.3–6.2)
ERYTHROCYTE [DISTWIDTH] IN BLOOD BY AUTOMATED COUNT: 12.2 % (ref 12.3–15.4)
GLOBULIN UR ELPH-MCNC: 3 GM/DL
GLUCOSE SERPL-MCNC: 69 MG/DL (ref 65–99)
HCT VFR BLD AUTO: 46 % (ref 37.5–51)
HDLC SERPL-MCNC: 67 MG/DL (ref 40–60)
HGB BLD-MCNC: 15 G/DL (ref 13–17.7)
IMM GRANULOCYTES # BLD AUTO: 0.01 10*3/MM3 (ref 0–0.05)
IMM GRANULOCYTES NFR BLD AUTO: 0.2 % (ref 0–0.5)
LDLC SERPL CALC-MCNC: 63 MG/DL (ref 0–100)
LDLC/HDLC SERPL: 0.94 {RATIO}
LYMPHOCYTES # BLD AUTO: 2.09 10*3/MM3 (ref 0.7–3.1)
LYMPHOCYTES NFR BLD AUTO: 32.7 % (ref 19.6–45.3)
MCH RBC QN AUTO: 28.7 PG (ref 26.6–33)
MCHC RBC AUTO-ENTMCNC: 32.6 G/DL (ref 31.5–35.7)
MCV RBC AUTO: 88 FL (ref 79–97)
MONOCYTES # BLD AUTO: 0.62 10*3/MM3 (ref 0.1–0.9)
MONOCYTES NFR BLD AUTO: 9.7 % (ref 5–12)
NEUTROPHILS NFR BLD AUTO: 3.15 10*3/MM3 (ref 1.7–7)
NEUTROPHILS NFR BLD AUTO: 49.3 % (ref 42.7–76)
NRBC BLD AUTO-RTO: 0 /100 WBC (ref 0–0.2)
PLATELET # BLD AUTO: 250 10*3/MM3 (ref 140–450)
PMV BLD AUTO: 10.9 FL (ref 6–12)
POTASSIUM SERPL-SCNC: 3.6 MMOL/L (ref 3.5–5.2)
PROT SERPL-MCNC: 7.1 G/DL (ref 6–8.5)
RBC # BLD AUTO: 5.23 10*6/MM3 (ref 4.14–5.8)
SODIUM SERPL-SCNC: 141 MMOL/L (ref 136–145)
TRIGL SERPL-MCNC: 59 MG/DL (ref 0–150)
TSH SERPL DL<=0.05 MIU/L-ACNC: 1.29 UIU/ML (ref 0.27–4.2)
VLDLC SERPL-MCNC: 12 MG/DL (ref 5–40)
WBC NRBC COR # BLD: 6.39 10*3/MM3 (ref 3.4–10.8)

## 2023-07-27 ENCOUNTER — TELEPHONE (OUTPATIENT)
Dept: FAMILY MEDICINE CLINIC | Facility: CLINIC | Age: 53
End: 2023-07-27
Payer: OTHER GOVERNMENT

## 2023-07-27 DIAGNOSIS — J45.20 MILD INTERMITTENT ASTHMA WITHOUT COMPLICATION: Primary | ICD-10-CM

## 2023-07-27 NOTE — TELEPHONE ENCOUNTER
[facility] CALL[facility]. THE PATIENT HAS BEEN SEEING THIS OFFICE FROM HIS LAST REFERRAL. THEY ARE NEEDING A NEW REFERRAL/AUTHORIZATION FROM [facility] TO SEE THE PATIENT. HE IS BEING SEEN ON 8/25/23.       J45.20 (ICD-10-CM) - Mild intermittent asthma without complication

## 2023-07-27 NOTE — TELEPHONE ENCOUNTER
THEY ARE NEEDING THE REFERRAL FOR .  AND I WILL PUT IN A NEW REFERRAL IN [facility] FOR THIS. THANKS

## 2023-08-08 NOTE — TELEPHONE ENCOUNTER
NEW [facility] AUTH SUBMITTED AND PENDING REVIEW.    CARMEN FROM [facility] CALLED AND ASKED THAT I SUBMIT A NEW AUTH WITH THE NPI OF OUR OFFICE. I ADVISED THAT I HAVE THAT CHANGED FROM THE [facility]'S NPI AND THAT I WOULD SUBMIT A NEW AUTH TO [facility].

## 2023-09-11 ENCOUNTER — OFFICE VISIT (OUTPATIENT)
Dept: FAMILY MEDICINE CLINIC | Facility: CLINIC | Age: 53
End: 2023-09-11
Payer: OTHER GOVERNMENT

## 2023-09-11 ENCOUNTER — LAB (OUTPATIENT)
Dept: FAMILY MEDICINE CLINIC | Facility: CLINIC | Age: 53
End: 2023-09-11
Payer: OTHER GOVERNMENT

## 2023-09-11 VITALS
HEART RATE: 68 BPM | TEMPERATURE: 97.2 F | SYSTOLIC BLOOD PRESSURE: 135 MMHG | RESPIRATION RATE: 16 BRPM | BODY MASS INDEX: 29.38 KG/M2 | WEIGHT: 198.4 LBS | OXYGEN SATURATION: 97 % | DIASTOLIC BLOOD PRESSURE: 80 MMHG | HEIGHT: 69 IN

## 2023-09-11 DIAGNOSIS — E55.9 VITAMIN D DEFICIENCY: ICD-10-CM

## 2023-09-11 DIAGNOSIS — Z12.5 SCREENING PSA (PROSTATE SPECIFIC ANTIGEN): ICD-10-CM

## 2023-09-11 DIAGNOSIS — Z00.00 ENCOUNTER FOR WELL ADULT EXAM WITHOUT ABNORMAL FINDINGS: Primary | ICD-10-CM

## 2023-09-11 PROCEDURE — 80061 LIPID PANEL: CPT | Performed by: FAMILY MEDICINE

## 2023-09-11 PROCEDURE — G0103 PSA SCREENING: HCPCS | Performed by: FAMILY MEDICINE

## 2023-09-11 PROCEDURE — 82306 VITAMIN D 25 HYDROXY: CPT | Performed by: FAMILY MEDICINE

## 2023-09-11 PROCEDURE — 99396 PREV VISIT EST AGE 40-64: CPT | Performed by: FAMILY MEDICINE

## 2023-09-11 PROCEDURE — 85025 COMPLETE CBC W/AUTO DIFF WBC: CPT | Performed by: FAMILY MEDICINE

## 2023-09-11 PROCEDURE — 36415 COLL VENOUS BLD VENIPUNCTURE: CPT | Performed by: FAMILY MEDICINE

## 2023-09-11 PROCEDURE — 80053 COMPREHEN METABOLIC PANEL: CPT | Performed by: FAMILY MEDICINE

## 2023-09-11 NOTE — PROGRESS NOTES
Subjective   Ashish Ruffin is a 53 y.o. male.     History of Present Illness     The patient comes in today for annual physical.  The patient is doing well denies anxiety, depressed mood, abdominal pain, change headache, cough,chest pain, palpitations, dizziness and SOB. The patient admits to dietary compliance is  fair  and exercising regularly.        The following portions of the patient's history were reviewed and updated as appropriate: past medical history, past social history, past surgical history and problem list.    Review of Systems   Constitutional:  Negative for activity change, appetite change and fatigue.   Eyes:  Negative for blurred vision.   Respiratory:  Negative for shortness of breath.    Cardiovascular:  Negative for chest pain and palpitations.   Gastrointestinal:  Negative for abdominal pain.   Neurological:  Negative for headache.   Psychiatric/Behavioral:  Negative for sleep disturbance. The patient is not nervous/anxious.      Objective   Physical Exam  Vitals reviewed.   Constitutional:       Appearance: He is well-developed.   Neck:      Thyroid: No thyromegaly.   Pulmonary:      Breath sounds: Normal breath sounds. No wheezing.   Abdominal:      Tenderness: There is no guarding.   Musculoskeletal:         General: Normal range of motion.      Cervical back: Normal range of motion and neck supple.   Neurological:      Mental Status: He is alert and oriented to person, place, and time.   Psychiatric:         Mood and Affect: Mood normal.     Vitals:    09/11/23 1433   BP: 135/80   Pulse: 68   Resp: 16   Temp: 97.2 °F (36.2 °C)   SpO2: 97%     Body mass index is 29.3 kg/m².  BMI is >= 25 and <30. (Overweight) The following options were offered after discussion;: exercise counseling/recommendations and nutrition counseling/recommendations   Current Outpatient Medications on File Prior to Visit   Medication Sig Dispense Refill    albuterol sulfate  (90 Base) MCG/ACT inhaler Inhale 1  puff Every 6 (Six) Hours As Needed.      cetirizine (zyrTEC) 10 MG tablet Take 1 tablet by mouth Daily.      Cholecalciferol (VITAMIN D) 25 MCG (1000 UT) tablet Take 1 tablet by mouth Daily.      fluticasone-salmeterol (ADVAIR) 250-50 MCG/DOSE DISKUS Inhale 1 puff 2 (Two) Times a Day.      montelukast (SINGULAIR) 10 MG tablet Take 1 tablet by mouth Daily.      vitamin C (ASCORBIC ACID) 500 MG tablet Take 1 tablet by mouth Daily.      Zinc 30 MG tablet Take 1 tablet by mouth Daily.       No current facility-administered medications on file prior to visit.       Assessment & Plan   Problems Addressed this Visit          Genitourinary and Reproductive     Screening PSA (prostate specific antigen)    Relevant Orders    PSA SCREENING       Health Encounters    Encounter for well adult exam without abnormal findings - Primary     Discussed healthy diet and  importance of regular exercise. Stressed importance of moderation in sodium/caffeine intake,  cholesterol, caloric balance, sufficient intake of fresh fruits and vegetables.         Relevant Orders    Lipid panel    Comprehensive metabolic panel    CBC w AUTO Differential     Other Visit Diagnoses       Vitamin D deficiency        Relevant Orders    Vitamin D 25 hydroxy          Diagnoses         Codes Comments    Encounter for well adult exam without abnormal findings    -  Primary ICD-10-CM: Z00.00  ICD-9-CM: V70.0     Screening PSA (prostate specific antigen)     ICD-10-CM: Z12.5  ICD-9-CM: V76.44     Vitamin D deficiency     ICD-10-CM: E55.9  ICD-9-CM: 268.9

## 2023-09-12 LAB
25(OH)D3 SERPL-MCNC: 39 NG/ML (ref 30–100)
ALBUMIN SERPL-MCNC: 4.6 G/DL (ref 3.5–5.2)
ALBUMIN/GLOB SERPL: 1.4 G/DL
ALP SERPL-CCNC: 65 U/L (ref 39–117)
ALT SERPL W P-5'-P-CCNC: 19 U/L (ref 1–41)
ANION GAP SERPL CALCULATED.3IONS-SCNC: 11.3 MMOL/L (ref 5–15)
AST SERPL-CCNC: 21 U/L (ref 1–40)
BASOPHILS # BLD AUTO: 0.06 10*3/MM3 (ref 0–0.2)
BASOPHILS NFR BLD AUTO: 1 % (ref 0–1.5)
BILIRUB SERPL-MCNC: 0.6 MG/DL (ref 0–1.2)
BUN SERPL-MCNC: 12 MG/DL (ref 6–20)
BUN/CREAT SERPL: 9.3 (ref 7–25)
CALCIUM SPEC-SCNC: 9.9 MG/DL (ref 8.6–10.5)
CHLORIDE SERPL-SCNC: 103 MMOL/L (ref 98–107)
CHOLEST SERPL-MCNC: 204 MG/DL (ref 0–200)
CO2 SERPL-SCNC: 28.7 MMOL/L (ref 22–29)
CREAT SERPL-MCNC: 1.29 MG/DL (ref 0.76–1.27)
DEPRECATED RDW RBC AUTO: 37.7 FL (ref 37–54)
EGFRCR SERPLBLD CKD-EPI 2021: 66.3 ML/MIN/1.73
EOSINOPHIL # BLD AUTO: 0.22 10*3/MM3 (ref 0–0.4)
EOSINOPHIL NFR BLD AUTO: 3.6 % (ref 0.3–6.2)
ERYTHROCYTE [DISTWIDTH] IN BLOOD BY AUTOMATED COUNT: 12.3 % (ref 12.3–15.4)
GLOBULIN UR ELPH-MCNC: 3.3 GM/DL
GLUCOSE SERPL-MCNC: 81 MG/DL (ref 65–99)
HCT VFR BLD AUTO: 48 % (ref 37.5–51)
HDLC SERPL-MCNC: 84 MG/DL (ref 40–60)
HGB BLD-MCNC: 16 G/DL (ref 13–17.7)
IMM GRANULOCYTES # BLD AUTO: 0.01 10*3/MM3 (ref 0–0.05)
IMM GRANULOCYTES NFR BLD AUTO: 0.2 % (ref 0–0.5)
LDLC SERPL CALC-MCNC: 112 MG/DL (ref 0–100)
LDLC/HDLC SERPL: 1.32 {RATIO}
LYMPHOCYTES # BLD AUTO: 1.82 10*3/MM3 (ref 0.7–3.1)
LYMPHOCYTES NFR BLD AUTO: 30 % (ref 19.6–45.3)
MCH RBC QN AUTO: 28.4 PG (ref 26.6–33)
MCHC RBC AUTO-ENTMCNC: 33.3 G/DL (ref 31.5–35.7)
MCV RBC AUTO: 85.3 FL (ref 79–97)
MONOCYTES # BLD AUTO: 0.63 10*3/MM3 (ref 0.1–0.9)
MONOCYTES NFR BLD AUTO: 10.4 % (ref 5–12)
NEUTROPHILS NFR BLD AUTO: 3.33 10*3/MM3 (ref 1.7–7)
NEUTROPHILS NFR BLD AUTO: 54.8 % (ref 42.7–76)
NRBC BLD AUTO-RTO: 0 /100 WBC (ref 0–0.2)
PLATELET # BLD AUTO: 275 10*3/MM3 (ref 140–450)
PMV BLD AUTO: 10.8 FL (ref 6–12)
POTASSIUM SERPL-SCNC: 4.1 MMOL/L (ref 3.5–5.2)
PROT SERPL-MCNC: 7.9 G/DL (ref 6–8.5)
PSA SERPL-MCNC: 0.36 NG/ML (ref 0–4)
RBC # BLD AUTO: 5.63 10*6/MM3 (ref 4.14–5.8)
SODIUM SERPL-SCNC: 143 MMOL/L (ref 136–145)
TRIGL SERPL-MCNC: 45 MG/DL (ref 0–150)
VLDLC SERPL-MCNC: 8 MG/DL (ref 5–40)
WBC NRBC COR # BLD: 6.07 10*3/MM3 (ref 3.4–10.8)

## 2024-03-11 ENCOUNTER — LAB (OUTPATIENT)
Dept: FAMILY MEDICINE CLINIC | Facility: CLINIC | Age: 54
End: 2024-03-11
Payer: OTHER GOVERNMENT

## 2024-03-11 ENCOUNTER — TELEPHONE (OUTPATIENT)
Dept: NEUROLOGY | Facility: CLINIC | Age: 54
End: 2024-03-11
Payer: OTHER GOVERNMENT

## 2024-03-11 ENCOUNTER — OFFICE VISIT (OUTPATIENT)
Dept: FAMILY MEDICINE CLINIC | Facility: CLINIC | Age: 54
End: 2024-03-11
Payer: OTHER GOVERNMENT

## 2024-03-11 VITALS
BODY MASS INDEX: 30.01 KG/M2 | OXYGEN SATURATION: 96 % | HEIGHT: 69 IN | DIASTOLIC BLOOD PRESSURE: 88 MMHG | TEMPERATURE: 97.7 F | RESPIRATION RATE: 16 BRPM | SYSTOLIC BLOOD PRESSURE: 142 MMHG | HEART RATE: 74 BPM | WEIGHT: 202.6 LBS

## 2024-03-11 DIAGNOSIS — L60.8 NAIL DEFORMITY: ICD-10-CM

## 2024-03-11 DIAGNOSIS — E78.00 ELEVATED CHOLESTEROL: Primary | ICD-10-CM

## 2024-03-11 DIAGNOSIS — E78.00 ELEVATED CHOLESTEROL: ICD-10-CM

## 2024-03-11 LAB
ALBUMIN SERPL-MCNC: 4.4 G/DL (ref 3.5–5.2)
ALBUMIN/GLOB SERPL: 1.4 G/DL
ALP SERPL-CCNC: 66 U/L (ref 39–117)
ALT SERPL W P-5'-P-CCNC: 20 U/L (ref 1–41)
ANION GAP SERPL CALCULATED.3IONS-SCNC: 13 MMOL/L (ref 5–15)
AST SERPL-CCNC: 18 U/L (ref 1–40)
BILIRUB SERPL-MCNC: 0.7 MG/DL (ref 0–1.2)
BUN SERPL-MCNC: 13 MG/DL (ref 6–20)
BUN/CREAT SERPL: 10 (ref 7–25)
CALCIUM SPEC-SCNC: 9.5 MG/DL (ref 8.6–10.5)
CHLORIDE SERPL-SCNC: 101 MMOL/L (ref 98–107)
CHOLEST SERPL-MCNC: 198 MG/DL (ref 0–200)
CO2 SERPL-SCNC: 28 MMOL/L (ref 22–29)
CREAT SERPL-MCNC: 1.3 MG/DL (ref 0.76–1.27)
EGFRCR SERPLBLD CKD-EPI 2021: 65.3 ML/MIN/1.73
GLOBULIN UR ELPH-MCNC: 3.1 GM/DL
GLUCOSE SERPL-MCNC: 96 MG/DL (ref 65–99)
HDLC SERPL-MCNC: 79 MG/DL (ref 40–60)
LDLC SERPL CALC-MCNC: 110 MG/DL (ref 0–100)
LDLC/HDLC SERPL: 1.39 {RATIO}
POTASSIUM SERPL-SCNC: 4.5 MMOL/L (ref 3.5–5.2)
PROT SERPL-MCNC: 7.5 G/DL (ref 6–8.5)
SODIUM SERPL-SCNC: 142 MMOL/L (ref 136–145)
TRIGL SERPL-MCNC: 45 MG/DL (ref 0–150)
VLDLC SERPL-MCNC: 9 MG/DL (ref 5–40)

## 2024-03-11 PROCEDURE — 80053 COMPREHEN METABOLIC PANEL: CPT | Performed by: FAMILY MEDICINE

## 2024-03-11 PROCEDURE — 80061 LIPID PANEL: CPT | Performed by: FAMILY MEDICINE

## 2024-03-11 PROCEDURE — 99213 OFFICE O/P EST LOW 20 MIN: CPT | Performed by: FAMILY MEDICINE

## 2024-03-11 PROCEDURE — 36415 COLL VENOUS BLD VENIPUNCTURE: CPT

## 2024-03-11 RX ORDER — CETIRIZINE HYDROCHLORIDE 10 MG/1
10 TABLET ORAL DAILY
Qty: 90 TABLET | Refills: 3 | Status: SHIPPED | OUTPATIENT
Start: 2024-03-11

## 2024-03-11 NOTE — TELEPHONE ENCOUNTER
When here last summer, we did not have the download so he was to bring in his chip.    9 months latter.....He brought in the  chip,,        Change pressure to min 7 max 15    Keep appt in july as scheduled

## 2024-03-11 NOTE — PROGRESS NOTES
Subjective   Ashish Ruffin is a 54 y.o. male.     History of Present Illness  Patient present for 6-months follow-up on elevated cholesterol.  He also wanted to discuss about nail deformity.  He is state that almost 6 to 8-month ago he injured the left thumbnail.  Since then new nail is growing but it is thick yellow discolored and also has a dent in the middle of the nail.  His blood pressure was little bit elevated patient said his CPAP machine is not working properly he will call and schedule an appointment with his sleep specialist.  He is complaining of congestion and seasonal allergies but denies headache, dizziness, shortness of breath or chest pain.       The following portions of the patient's history were reviewed and updated as appropriate: past medical history, past social history, past surgical history and problem list.    Review of Systems   Constitutional:  Negative for activity change and appetite change.   HENT:  Positive for congestion, postnasal drip and rhinorrhea.    Respiratory:  Negative for cough, shortness of breath and wheezing.    Cardiovascular:  Negative for palpitations.   Skin:         Left thumbnail deformity with yellow discoloration and dent in the middle of the nail   Neurological:  Negative for headache.   Psychiatric/Behavioral:  Positive for sleep disturbance and stress.        Objective   Physical Exam  Vitals reviewed.   Constitutional:       Appearance: He is well-developed.   Neck:      Thyroid: No thyromegaly.   Cardiovascular:      Heart sounds: Normal heart sounds.   Pulmonary:      Effort: Pulmonary effort is normal.      Breath sounds: Normal breath sounds. No wheezing.   Skin:     Comments: Left thumbnail deformity with yellow discoloration and dent in the middle of the nail.  No redness or swelling.   Neurological:      Mental Status: He is alert and oriented to person, place, and time.   Psychiatric:         Mood and Affect: Mood normal.         Vitals:    03/11/24  0830   BP: 142/88   Pulse: 74   Resp: 16   Temp: 97.7 °F (36.5 °C)   SpO2: 96%     Current Outpatient Medications on File Prior to Visit   Medication Sig Dispense Refill    albuterol sulfate  (90 Base) MCG/ACT inhaler Inhale 1 puff Every 6 (Six) Hours As Needed.      Cholecalciferol (VITAMIN D) 25 MCG (1000 UT) tablet Take 1 tablet by mouth Daily.      fluticasone-salmeterol (ADVAIR) 250-50 MCG/DOSE DISKUS Inhale 1 puff 2 (Two) Times a Day.      montelukast (SINGULAIR) 10 MG tablet Take 1 tablet by mouth Daily.      vitamin C (ASCORBIC ACID) 500 MG tablet Take 1 tablet by mouth Daily.      Zinc 30 MG tablet Take 1 tablet by mouth Daily.      [DISCONTINUED] cetirizine (zyrTEC) 10 MG tablet Take 1 tablet by mouth Daily.       No current facility-administered medications on file prior to visit.         Assessment & Plan   Problems Addressed this Visit       Elevated cholesterol - Primary     Lipid abnormalities are unchanged.  We will check fasting lipid panel.  Counseled patient on lifestyle modifications to help control hyperlipidemia.   Patient Treatment Goals:   LDL goal is under 130  Follow up in 6 months.         Relevant Orders    Comprehensive metabolic panel    Lipid panel    Nail deformity, left thumb    Relevant Orders    Ambulatory Referral to Dermatology     Diagnoses         Codes Comments    Elevated cholesterol    -  Primary ICD-10-CM: E78.00  ICD-9-CM: 272.0     Nail deformity, left thumb     ICD-10-CM: L60.8  ICD-9-CM: 703.9

## 2024-03-11 NOTE — TELEPHONE ENCOUNTER
Patient came into office and handed me his download and said to share with you.  I asked if he was having issues; no just send copy to Dr. Seipel.     So not quite sure what we need to do for him.  Thanks!

## 2024-03-11 NOTE — ASSESSMENT & PLAN NOTE
Lipid abnormalities are unchanged.  We will check fasting lipid panel.  Counseled patient on lifestyle modifications to help control hyperlipidemia.   Patient Treatment Goals:   LDL goal is under 130  Follow up in 6 months.

## 2024-03-12 ENCOUNTER — PATIENT ROUNDING (BHMG ONLY) (OUTPATIENT)
Dept: FAMILY MEDICINE CLINIC | Facility: CLINIC | Age: 54
End: 2024-03-12
Payer: OTHER GOVERNMENT

## 2024-03-13 NOTE — TELEPHONE ENCOUNTER
Reviewing his last OV- patient got machine from [employer]. Will have to call pt to bring chip in.

## 2024-06-26 NOTE — PROGRESS NOTES
"Chief Complaint  Sleep Apnea    Subjective          Ashish Ruffin presents to [facility]UR[facility]  History of Present Illness  Yearly f/u cpap compliance patient states he is doing great on pap therapy,he uses nasal pillows and gets supplies from [employer]. Patient states he also purchased machine from [employer].     Sleep testing history:    This patient was evaluated in 2013,  The diagnostic testing is not available    PAP download:  The patient is on CPAP therapy at 4-20 cm/H2O.   Data indicates Excellent compliance. With 95% usage for more than 4 hours with an average usage of 6 hours 14 minutes. AHI down to 0.4 .  Average pressures 13.5.  Average large leak none.     The patient's hypersomnia has resolved       Robinson Sleepiness Scale:  Sitting and reading 2 WatchingTV 2  Sitting, inactive, in a public place 1  As a passenger in a car for 1 hour w/o a break  1  Lying down to rest in the afternoon  2  Sitting and talking to someone  0  Sitting quietly after a lunch  0  In a car, while stopped for traffic or a light  0  Total 8    Review of Systems   Respiratory:  Positive for apnea.    Allergic/Immunologic: Positive for environmental allergies and food allergies.   All other systems reviewed and are negative.        Objective   Vital Signs:   /84   Pulse 69   Resp 14   Ht 175.3 cm (69\")   Wt 91.6 kg (202 lb)   BMI 29.83 kg/m²     Physical Exam  Vitals reviewed.   Constitutional:       Appearance: Normal appearance.   Pulmonary:      Effort: Pulmonary effort is normal. No respiratory distress.   Neurological:      Mental Status: He is alert and oriented to person, place, and time.   Psychiatric:         Mood and Affect: Mood normal.      Result Review :                 Assessment and Plan    Diagnoses and all orders for this visit:    1. Obstructive sleep apnea on CPAP (Primary)      Continue cpap change pressure to min 6 max 20  The patient is compliant with and benefiting from PAP " therapy.      Follow Up   Return in about 1 year (around 7/1/2025).    Patient was given instructions and counseling regarding his condition or for health maintenance advice. Please see specific information pulled into the AVS if appropriate.       This document has been electronically signed by Joseph Seipel, MD on July 1, 2024 14:51 EDT

## 2024-06-28 DIAGNOSIS — G47.33 OBSTRUCTIVE SLEEP APNEA ON CPAP: Primary | ICD-10-CM

## 2024-07-01 ENCOUNTER — OFFICE VISIT (OUTPATIENT)
Dept: NEUROLOGY | Facility: CLINIC | Age: 54
End: 2024-07-01
Payer: OTHER GOVERNMENT

## 2024-07-01 VITALS
DIASTOLIC BLOOD PRESSURE: 84 MMHG | WEIGHT: 202 LBS | RESPIRATION RATE: 14 BRPM | BODY MASS INDEX: 29.92 KG/M2 | HEART RATE: 69 BPM | HEIGHT: 69 IN | SYSTOLIC BLOOD PRESSURE: 119 MMHG

## 2024-07-01 DIAGNOSIS — G47.33 OBSTRUCTIVE SLEEP APNEA ON CPAP: Primary | ICD-10-CM

## 2024-07-01 PROCEDURE — 99213 OFFICE O/P EST LOW 20 MIN: CPT | Performed by: PSYCHIATRY & NEUROLOGY

## 2024-09-09 ENCOUNTER — TELEPHONE (OUTPATIENT)
Dept: FAMILY MEDICINE CLINIC | Facility: CLINIC | Age: 54
End: 2024-09-09
Payer: OTHER GOVERNMENT

## 2024-09-09 DIAGNOSIS — J30.89 ENVIRONMENTAL AND SEASONAL ALLERGIES: ICD-10-CM

## 2024-09-09 DIAGNOSIS — J45.20 MILD INTERMITTENT ASTHMA WITHOUT COMPLICATION: Primary | ICD-10-CM

## 2024-09-09 NOTE — TELEPHONE ENCOUNTER
Caller: SELVIN    Relationship: [facility]    Best call back number:  [phone]    What is the medical concern/diagnosis: ALLERGY    What specialty or service is being requested: ALLERGY REFERRAL    What is the provider, practice or medical service name: [facility]     What is the office location:     What is the office phone number:     Any additional details: SELVIN WITH [facility] IS CALLING IN TO ASK FOR A REFERRAL FOR  FOR THE PATIENT FOR ALLERGY SHOTS AND ALL SERVICES.   [facility] FAX NUMBER.

## 2024-09-23 ENCOUNTER — OFFICE VISIT (OUTPATIENT)
Dept: FAMILY MEDICINE CLINIC | Facility: CLINIC | Age: 54
End: 2024-09-23
Payer: OTHER GOVERNMENT

## 2024-09-23 ENCOUNTER — LAB (OUTPATIENT)
Dept: FAMILY MEDICINE CLINIC | Facility: CLINIC | Age: 54
End: 2024-09-23
Payer: OTHER GOVERNMENT

## 2024-09-23 VITALS
TEMPERATURE: 97.5 F | SYSTOLIC BLOOD PRESSURE: 125 MMHG | WEIGHT: 206.3 LBS | HEART RATE: 75 BPM | HEIGHT: 69 IN | DIASTOLIC BLOOD PRESSURE: 78 MMHG | OXYGEN SATURATION: 95 % | BODY MASS INDEX: 30.56 KG/M2

## 2024-09-23 DIAGNOSIS — L29.9 ITCHING: ICD-10-CM

## 2024-09-23 DIAGNOSIS — Z00.00 ENCOUNTER FOR WELL ADULT EXAM WITHOUT ABNORMAL FINDINGS: Primary | ICD-10-CM

## 2024-09-23 LAB
ALBUMIN SERPL-MCNC: 4.2 G/DL (ref 3.5–5.2)
ALBUMIN/GLOB SERPL: 1.4 G/DL
ALP SERPL-CCNC: 57 U/L (ref 39–117)
ALT SERPL W P-5'-P-CCNC: 24 U/L (ref 1–41)
ANION GAP SERPL CALCULATED.3IONS-SCNC: 9.8 MMOL/L (ref 5–15)
AST SERPL-CCNC: 21 U/L (ref 1–40)
BASOPHILS # BLD AUTO: 0.08 10*3/MM3 (ref 0–0.2)
BASOPHILS NFR BLD AUTO: 1.4 % (ref 0–1.5)
BILIRUB SERPL-MCNC: 0.4 MG/DL (ref 0–1.2)
BUN SERPL-MCNC: 15 MG/DL (ref 6–20)
BUN/CREAT SERPL: 13.3 (ref 7–25)
CALCIUM SPEC-SCNC: 9.3 MG/DL (ref 8.6–10.5)
CHLORIDE SERPL-SCNC: 105 MMOL/L (ref 98–107)
CHOLEST SERPL-MCNC: 167 MG/DL (ref 0–200)
CO2 SERPL-SCNC: 26.2 MMOL/L (ref 22–29)
CREAT SERPL-MCNC: 1.13 MG/DL (ref 0.76–1.27)
DEPRECATED RDW RBC AUTO: 40 FL (ref 37–54)
EGFRCR SERPLBLD CKD-EPI 2021: 77.2 ML/MIN/1.73
EOSINOPHIL # BLD AUTO: 0.6 10*3/MM3 (ref 0–0.4)
EOSINOPHIL NFR BLD AUTO: 10.2 % (ref 0.3–6.2)
ERYTHROCYTE [DISTWIDTH] IN BLOOD BY AUTOMATED COUNT: 12.3 % (ref 12.3–15.4)
GLOBULIN UR ELPH-MCNC: 2.9 GM/DL
GLUCOSE SERPL-MCNC: 96 MG/DL (ref 65–99)
HCT VFR BLD AUTO: 46.8 % (ref 37.5–51)
HDLC SERPL-MCNC: 69 MG/DL (ref 40–60)
HGB BLD-MCNC: 15 G/DL (ref 13–17.7)
IMM GRANULOCYTES # BLD AUTO: 0.01 10*3/MM3 (ref 0–0.05)
IMM GRANULOCYTES NFR BLD AUTO: 0.2 % (ref 0–0.5)
LDLC SERPL CALC-MCNC: 91 MG/DL (ref 0–100)
LDLC/HDLC SERPL: 1.33 {RATIO}
LYMPHOCYTES # BLD AUTO: 1.72 10*3/MM3 (ref 0.7–3.1)
LYMPHOCYTES NFR BLD AUTO: 29.1 % (ref 19.6–45.3)
MCH RBC QN AUTO: 28.1 PG (ref 26.6–33)
MCHC RBC AUTO-ENTMCNC: 32.1 G/DL (ref 31.5–35.7)
MCV RBC AUTO: 87.6 FL (ref 79–97)
MONOCYTES # BLD AUTO: 0.63 10*3/MM3 (ref 0.1–0.9)
MONOCYTES NFR BLD AUTO: 10.7 % (ref 5–12)
NEUTROPHILS NFR BLD AUTO: 2.87 10*3/MM3 (ref 1.7–7)
NEUTROPHILS NFR BLD AUTO: 48.4 % (ref 42.7–76)
NRBC BLD AUTO-RTO: 0 /100 WBC (ref 0–0.2)
PLATELET # BLD AUTO: 258 10*3/MM3 (ref 140–450)
PMV BLD AUTO: 11 FL (ref 6–12)
POTASSIUM SERPL-SCNC: 4.2 MMOL/L (ref 3.5–5.2)
PROT SERPL-MCNC: 7.1 G/DL (ref 6–8.5)
RBC # BLD AUTO: 5.34 10*6/MM3 (ref 4.14–5.8)
SODIUM SERPL-SCNC: 141 MMOL/L (ref 136–145)
TRIGL SERPL-MCNC: 31 MG/DL (ref 0–150)
VLDLC SERPL-MCNC: 7 MG/DL (ref 5–40)
WBC NRBC COR # BLD AUTO: 5.91 10*3/MM3 (ref 3.4–10.8)

## 2024-09-23 PROCEDURE — 36415 COLL VENOUS BLD VENIPUNCTURE: CPT | Performed by: FAMILY MEDICINE

## 2024-09-23 PROCEDURE — 85025 COMPLETE CBC W/AUTO DIFF WBC: CPT | Performed by: FAMILY MEDICINE

## 2024-09-23 PROCEDURE — 80053 COMPREHEN METABOLIC PANEL: CPT | Performed by: FAMILY MEDICINE

## 2024-09-23 PROCEDURE — 80061 LIPID PANEL: CPT | Performed by: FAMILY MEDICINE

## 2024-09-23 PROCEDURE — 99396 PREV VISIT EST AGE 40-64: CPT | Performed by: FAMILY MEDICINE

## 2025-03-24 ENCOUNTER — OFFICE VISIT (OUTPATIENT)
Dept: FAMILY MEDICINE CLINIC | Facility: CLINIC | Age: 55
End: 2025-03-24
Payer: OTHER GOVERNMENT

## 2025-03-24 VITALS
TEMPERATURE: 97.7 F | BODY MASS INDEX: 31.12 KG/M2 | RESPIRATION RATE: 16 BRPM | HEART RATE: 78 BPM | DIASTOLIC BLOOD PRESSURE: 86 MMHG | SYSTOLIC BLOOD PRESSURE: 132 MMHG | OXYGEN SATURATION: 95 % | HEIGHT: 69 IN | WEIGHT: 210.1 LBS

## 2025-03-24 DIAGNOSIS — J45.20 MILD INTERMITTENT ASTHMA WITHOUT COMPLICATION: Primary | ICD-10-CM

## 2025-03-24 PROCEDURE — 99213 OFFICE O/P EST LOW 20 MIN: CPT | Performed by: FAMILY MEDICINE

## 2025-03-24 NOTE — ASSESSMENT & PLAN NOTE
Asthma is stable.  The patient is experiencing no daytime asthma symptoms and he is experiencing no nighttime asthma symptoms.  Plan: Continue same medication/s without change.    Followup in 6 months.

## 2025-03-24 NOTE — PROGRESS NOTES
Subjective   Ashish Ruffin is a 55 y.o. male.     History of Present Illness     History of Present Illness  The patient is a 55-year-old male who presents for a 6-months follow-up on asthma and allergies.  He reports a satisfactory winter season with no exacerbation of his asthma symptoms. He has not required emergency room visits or urgent care consultations due to asthma flare-ups. He does not experience any current symptoms of cough, wheezing, shortness of breath, or headache. His current medication regimen includes Zyrtec, Singulair and Advair, which he administers only when exposed to known allergens. He also utilizes an albuterol inhaler as a rescue measure during asthma attacks or episodes of constricted breathing.  He has been receiving monthly allergy injections at [facility] allergy and immunology office.  He is complaining of runny nose.  He is complaining of runny nose when exposed to the known  allergen but denies shortness of breath, sore throat or wheezing.      The following portions of the patient's history were reviewed and updated as appropriate: past medical history, past social history, past surgical history and problem list.    Review of Systems   Constitutional:  Negative for activity change, appetite change and fever.   HENT:  Negative for sore throat.    Respiratory:  Negative for cough, chest tightness, shortness of breath and wheezing.    Cardiovascular:  Negative for chest pain and palpitations.   Gastrointestinal:  Negative for abdominal pain.   Skin:  Negative for rash.   Neurological:  Negative for headache.         Objective   Physical Exam  Vitals reviewed.   Constitutional:       Appearance: He is well-developed.   Neck:      Thyroid: No thyromegaly.   Cardiovascular:      Heart sounds: Normal heart sounds.   Pulmonary:      Effort: Pulmonary effort is normal.      Breath sounds: Normal breath sounds. No wheezing.   Abdominal:      Tenderness: There is no abdominal tenderness.    Neurological:      Mental Status: He is alert and oriented to person, place, and time.   Psychiatric:         Mood and Affect: Mood normal.         Vitals:    03/24/25 0837   BP: 132/86   Pulse: 78   Resp: 16   Temp: 97.7 °F (36.5 °C)   SpO2: 95%     Current Outpatient Medications on File Prior to Visit   Medication Sig Dispense Refill    albuterol sulfate  (90 Base) MCG/ACT inhaler Inhale 1 puff Every 6 (Six) Hours As Needed.      cetirizine (zyrTEC) 10 MG tablet Take 1 tablet by mouth Daily. 90 tablet 3    Cholecalciferol (VITAMIN D) 25 MCG (1000 UT) tablet Take 1 tablet by mouth Daily.      fluticasone-salmeterol (ADVAIR) 250-50 MCG/DOSE DISKUS Inhale 1 puff 2 (Two) Times a Day.      montelukast (SINGULAIR) 10 MG tablet Take 1 tablet by mouth Daily.      vitamin C (ASCORBIC ACID) 500 MG tablet Take 1 tablet by mouth Daily.      Zinc 30 MG tablet Take 1 tablet by mouth Daily.       No current facility-administered medications on file prior to visit.         Assessment & Plan   Problems Addressed this Visit       Asthma - Primary    Asthma is stable.  The patient is experiencing no daytime asthma symptoms and he is experiencing no nighttime asthma symptoms.  Plan: Continue same medication/s without change.    Followup in 6 months.          Diagnoses         Codes Comments      Mild intermittent asthma without complication    -  Primary ICD-10-CM: J45.20  ICD-9-CM: 493.90             Assessment & Plan  1. Asthma.  Stable- He has not experienced any asthma flare-ups requiring ER or urgent care visits. He uses albuterol as a rescue inhaler for asthma attacks or constricted breathing. He is advised to continue using Singulair and Advair     2. Allergies.  He reports a resurgence in environmental and animal allergies. He receives monthly allergy shots and feels they are effective.            Patient or patient representative verbalized consent for the use of Ambient Listening during the visit with  Ewelina  MD Juan for chart documentation. 3/24/2025  08:43 EDT

## 2025-07-15 NOTE — PROGRESS NOTES
"Chief Complaint  Sleep Apnea    Subjective          Ashish Ruffin presents to [facility] NEUROLOGY  History of Present Illness  Yearly f/u cpap compliance patient states he is doing great on pap therapy,he uses nasal pillows and gets supplies from [employer]. Patient states he also purchased machine from [employer]     Sleep testing history:    This patient was evaluated in 2013,The diagnostic testing is not available    PAP download:  The patient is on CPAP therapy at 5-20 cm/H2O.   Data indicates Excellent compliance. With 100% usage for more than 4 hours with an average usage of 6 hours 10 minutes. AHI down to 0.4 .  Average pressures 13.6.  Average large leak 0.4.     The patient's hypersomnia has resolved       [place] Sleepiness Scale:  Sitting and reading JS [place] Sleepiness: 1 WatchingTV JS [place] Sleepiness: 1  Sitting, inactive, in a public place JS [place] Sleepiness: 1  As a passenger in a car for 1 hour w/o a break  JS [place] Sleepiness: 1  Lying down to rest in the afternoon  JS [place] Sleepiness: 3   Sitting and talking to someone  JS [place] Sleepiness: 0  Sitting quietly after a lunch  JS [place] Sleepiness: 1  In a car, while stopped for traffic or a light  JS [place] Sleepiness: 0  Total 8    Review of Systems   Constitutional:  Positive for unexpected weight change.   Respiratory:  Positive for apnea.    Allergic/Immunologic: Positive for environmental allergies and food allergies.   Psychiatric/Behavioral:  Positive for decreased concentration.    All other systems reviewed and are negative.        Objective   Vital Signs:   Ht 175.3 cm (69\")   Wt 96.6 kg (213 lb)   BMI 31.45 kg/m²     Physical Exam  Vitals reviewed.   Constitutional:       Appearance: Normal appearance.   Cardiovascular:      Rate and Rhythm: Normal rate.   Pulmonary:      Effort: Pulmonary effort is normal. No respiratory distress.   Neurological:      Mental Status: He is alert and oriented to person, " place, and time.   Psychiatric:         Mood and Affect: Mood normal.        Result Review :                 Assessment and Plan    Diagnoses and all orders for this visit:    1. Obstructive sleep apnea on CPAP (Primary)      Will order new CPAP from DME  The patient is compliant with and benefiting from PAP therapy.      Follow Up   Return in about 1 year (around 7/17/2026) for Follow Up visit 30 to 90 days after obtaining PAP.    Patient was given instructions and counseling regarding his condition or for health maintenance advice. Please see specific information pulled into the AVS if appropriate.       This document has been electronically signed by Joseph Seipel, MD on July 17, 2025 14:07 EDT

## 2025-07-17 ENCOUNTER — OFFICE VISIT (OUTPATIENT)
Dept: NEUROLOGY | Facility: CLINIC | Age: 55
End: 2025-07-17
Payer: OTHER GOVERNMENT

## 2025-07-17 VITALS — BODY MASS INDEX: 31.55 KG/M2 | WEIGHT: 213 LBS | HEIGHT: 69 IN

## 2025-07-17 DIAGNOSIS — G47.33 OBSTRUCTIVE SLEEP APNEA ON CPAP: Primary | ICD-10-CM

## 2025-07-17 PROCEDURE — 99213 OFFICE O/P EST LOW 20 MIN: CPT | Performed by: PSYCHIATRY & NEUROLOGY
